# Patient Record
Sex: MALE | Race: WHITE | NOT HISPANIC OR LATINO | URBAN - NONMETROPOLITAN AREA
[De-identification: names, ages, dates, MRNs, and addresses within clinical notes are randomized per-mention and may not be internally consistent; named-entity substitution may affect disease eponyms.]

---

## 2020-10-21 ENCOUNTER — IMPORTED ENCOUNTER (OUTPATIENT)
Dept: URBAN - NONMETROPOLITAN AREA CLINIC 1 | Facility: CLINIC | Age: 50
End: 2020-10-21

## 2020-10-21 PROBLEM — H52.4: Noted: 2020-10-21

## 2020-10-21 PROCEDURE — 92015 DETERMINE REFRACTIVE STATE: CPT

## 2020-10-21 PROCEDURE — 92004 COMPRE OPH EXAM NEW PT 1/>: CPT

## 2022-04-15 ASSESSMENT — TONOMETRY
OD_IOP_MMHG: 16
OS_IOP_MMHG: 16

## 2022-04-15 ASSESSMENT — VISUAL ACUITY
OD_CC: 20/20-
OS_CC: 20/30

## 2025-02-21 ENCOUNTER — NEW PATIENT (OUTPATIENT)
Age: 55
End: 2025-02-21

## 2025-02-21 DIAGNOSIS — H52.4: ICD-10-CM

## 2025-02-21 DIAGNOSIS — H52.03: ICD-10-CM

## 2025-02-21 DIAGNOSIS — H52.223: ICD-10-CM

## 2025-02-21 PROCEDURE — 92015 DETERMINE REFRACTIVE STATE: CPT

## 2025-02-21 PROCEDURE — 92004 COMPRE OPH EXAM NEW PT 1/>: CPT
